# Patient Record
Sex: MALE | Race: WHITE
[De-identification: names, ages, dates, MRNs, and addresses within clinical notes are randomized per-mention and may not be internally consistent; named-entity substitution may affect disease eponyms.]

---

## 2018-07-28 ENCOUNTER — HOSPITAL ENCOUNTER (EMERGENCY)
Dept: HOSPITAL 68 - ERH | Age: 28
End: 2018-07-28
Payer: COMMERCIAL

## 2018-07-28 VITALS — BODY MASS INDEX: 21.47 KG/M2 | HEIGHT: 70 IN | WEIGHT: 150 LBS

## 2018-07-28 VITALS — SYSTOLIC BLOOD PRESSURE: 122 MMHG | DIASTOLIC BLOOD PRESSURE: 61 MMHG

## 2018-07-28 DIAGNOSIS — R19.7: ICD-10-CM

## 2018-07-28 DIAGNOSIS — R10.9: Primary | ICD-10-CM

## 2018-07-28 LAB
ABSOLUTE GRANULOCYTE CT: 5.5 /CUMM (ref 1.4–6.5)
BASOPHILS # BLD: 0 /CUMM (ref 0–0.2)
BASOPHILS NFR BLD: 0.2 % (ref 0–2)
EOSINOPHIL # BLD: 0 /CUMM (ref 0–0.7)
EOSINOPHIL NFR BLD: 0 % (ref 0–5)
ERYTHROCYTE [DISTWIDTH] IN BLOOD BY AUTOMATED COUNT: 12.3 % (ref 11.5–14.5)
GRANULOCYTES NFR BLD: 80.4 % (ref 42.2–75.2)
HCT VFR BLD CALC: 49.9 % (ref 42–52)
LYMPHOCYTES # BLD: 0.7 /CUMM (ref 1.2–3.4)
MCH RBC QN AUTO: 30.2 PG (ref 27–31)
MCHC RBC AUTO-ENTMCNC: 34.1 G/DL (ref 33–37)
MCV RBC AUTO: 88.6 FL (ref 80–94)
MONOCYTES # BLD: 0.6 /CUMM (ref 0.1–0.6)
PLATELET # BLD: 181 /CUMM (ref 130–400)
PMV BLD AUTO: 7.8 FL (ref 7.4–10.4)
RED BLOOD CELL CT: 5.63 /CUMM (ref 4.7–6.1)
WBC # BLD AUTO: 6.8 /CUMM (ref 4.8–10.8)

## 2018-07-28 NOTE — ED GENERAL ADULT
History of Present Illness
 
General
Chief Complaint: Abdominal Pain/Flank Pain
Stated Complaint: ABD PAIN, DIARRHEA X 2 DAYS
Source: patient
Exam Limitations: no limitations
 
Vital Signs & Intake/Output
Vital Signs & Intake/Output
 Vital Signs
 
 
Date Time Temp Pulse Resp B/P B/P Pulse O2 O2 Flow FiO2
 
     Mean Ox Delivery Rate 
 
07/28 0820       Room Air Room Air 
 
07/28 0742 98.4 107 18 125/78  98 Room Air Room Air 
 
 
 
Allergies
Coded Allergies:
NO KNOWN ALLERGIES (07/28/18)
 
Triage Note:
TRIAGE:
 27 Y/O MALE PRESENTS C/O FEVER, DIARRHEA X 2 DAYS.
 REPORTS +NAUSEA, DENIES VOMITING.
 "I FEEL LIKE MOTION SICK, LIKE I WANT TO THROW UP,
 BUT I CAN'T."
 C/O MID LOWER ABDOMINAL PAIN AND CRAMPING.
 NUMERICAL PAIN VALUE VARIES.
 REPORTS USED TYLENOL LAST NIGHT TO COMBAT NIGHTLY
 FEVER.
Triage Nurses Notes Reviewed? yes
HPI:
Healthy 28-year-old male presents with nausea, diarrhea and abdominal discomfort
since yesterday.  He reports subjective fevers.  He denies any other medical 
issues.  He still has his appendix.
 
Past History
 
Travel History
Traveled to [place] past 21 day No
 
Medical History
Any Pertinent Medical History? none
Neurological: NONE
EENT: NONE
Cardiovascular: NONE
Respiratory: NONE
Gastrointestinal: NONE
Hepatic: NONE
Renal: NONE
Musculoskeletal: NONE
Psychiatric: NONE
Endocrine: NONE
Blood Disorders: NONE
Cancer(s): NONE
 
Surgical History
Surgical History: none
 
Psychosocial History
What is your primary language English
Tobacco Use: Never used
ETOH Use: denies use
Illicit Drug Use: denies illicit drug use
 
Family History
Hx Contributory? No
 
Review of Systems
 
Review of Systems
Constitutional:
Reports: chills, fever. 
EENTM:
Denies: blurred vision, double vision, visual changes. 
Respiratory:
Denies: cough, hemoptysis, orthopnea. 
Cardiovascular:
Denies: chest pain, edema, orthopena. 
GI:
Reports: abdominal pain, diarrhea, nausea.  Denies: bloating, constipation. 
Genitourinary:
Denies: discharge, dysuria, frequency. 
Musculoskeletal:
Denies: back pain, gout. 
Skin:
Denies: cysts, change in skin color, change in hair/nails. 
Neurological/Psychological:
Denies: anxiety, ataxia. 
Hematologic/Endocrine:
Denies: bruising, bleeding. 
 
Physical Exam
 
Physical Exam
General Appearance: well developed/nourished, no apparent distress, alert, awake
, anxious
Head: atraumatic, normal appearance, active bleeding
Eyes:
Bilateral: PERRL, EOMI, pale conjunctivae. 
Ears, Nose, Throat: normal pharynx, normal ENT inspection
Neck: normal inspection, supple
Respiratory: normal breath sounds, chest non-tender, no respiratory distress
Cardiovascular: regular rate/rhythm
Gastrointestinal: normal bowel sounds, soft, non-tender, no organomegaly
Back: normal inspection, normal range of motion
Extremities: normal inspection, normal capillary refill, normal range of motion,
no edema
Skin: normal color, warm/dry, cyanosis
 
Core Measures
ACS in differential dx? No
CVA/TIA Diagnosis: No
Sepsis Present: No
Sepsis Focused Exam Completed? No
 
Progress
Differential Diagnoses
28-year-old male comes in with abdominal discomfort and nausea, diarrhea and 
subjective fever.
Patient is healthy
 
The patient has a benign abdominal exam.  No tenderness.  No right lower 
quadrant tenderness.  Normal walking, jumping and moving.  No CVA tenderness.  
Extremely low suspicion for appendicitis or acute surgical pathology.
 
We will get labs and treat the patient symptomatically and reassess.
Plan of Care:
 Orders
 
 
Procedure Date/time Status
 
Saline Lock 07/28 0800 Active
 
LIPASE 07/28 0800 Complete
 
COMPREHENSIVE METABOLIC PANEL 07/28 0800 Complete
 
CBC WITHOUT DIFFERENTIAL 07/28 0800 Complete
 
 
 Laboratory Tests
 
 
 
07/28/18 0813:
Anion Gap 15, Estimated GFR > 60, BUN/Creatinine Ratio 10.0, Glucose 122  H, 
Calcium 10.0, Total Bilirubin 1.0, AST 24, ALT 23, Alkaline Phosphatase 59, 
Total Protein 7.7, Albumin 4.7, Globulin 3.0, Albumin/Globulin Ratio 1.6, Lipase
56, CBC w Diff NO MAN DIFF REQ, RBC 5.63, MCV 88.6, MCH 30.2, MCHC 34.1, RDW 
12.3, MPV 7.8, Gran % 80.4  H, Lymphocytes % 10.5  L, Monocytes % 8.9, 
Eosinophils % 0, Basophils % 0.2, Absolute Granulocytes 5.5, Absolute 
Lymphocytes 0.7  L, Absolute Monocytes 0.6, Absolute Eosinophils 0, Absolute 
Basophils 0
 
Initial ED EKG: none
Comments:
0905 patient doing well.  No distress.  Abdomen is better.
 
Exam is better.
 
No nausea or vomiting.  P.o. tolerant.  Plan will be discharged home with 
symptomatic treatment.
 
Reexamination done, no concern for appendicitis.  Patient given appendicitis 
warnings.  Mandatory recheck tomorrow.
 
Departure
 
Departure
Time of Disposition: 0905
Disposition: HOME OR SELF CARE
Condition: Stable
Clinical Impression
Primary Impression: Abdominal pain
Secondary Impressions: Diarrhea
Referrals:
Sanford Velasquez MD (PCP/Family)
 
Additional Instructions:
Return tomorrow for reexamination.
 
Return sooner if your worse.
 
Take Tylenol Motrin for pain.  Take nausea medicine.
 
Used to BRAT diet.
Departure Forms:
Customer Survey
General Discharge Information
 
Critical Care Note
 
Critical Care Note
Critical Care Time: non-applicable